# Patient Record
(demographics unavailable — no encounter records)

---

## 2025-01-16 NOTE — PHYSICAL EXAM
[Normal] : affect appropriate [de-identified] : Supple without JVD, or masses appreciated .  Prominent thyromegaly present [de-identified] : obese [de-identified] : Hyperpigmented areas on both distal lower extremities, dry skin, excoriations/scars on back

## 2025-01-16 NOTE — HISTORY OF PRESENT ILLNESS
[de-identified] : The patient is a 69-year-old female with a history of type 2 diabetes, hypertension, chronic renal disease, thyroid disorder who returns for follow-up of IgG monoclonal gammopathy, low folate and iron deficiency. At the last visit in July of 2024, the CBC was significant for a hemoglobin of 11.6, hematocrit 36.0 and a normal white blood count and platelet count. Comprehensive metabolic panel revealed a glucose of 124 but was otherwise unremarkable with a normal creatinine, total protein and calcium. Immunoelectrophoresis again revealed an M spike that was unchanged at 0.5 with normal amounts of IgG, IgA, IgM and an IgG lambda band identified on immunofixation. The kappa to lambda ratio was stable at 1.73 iron panel revealed a low normal saturation at 15% and the ferritin was 73. B12 was greater than 2000 and folate was normal. All of the parameters including LDH, uric acid, CRP, sed rate, beta-2 microglobulin were either normal or stable. Patient has been strictly vegetarian but has recently added chicken and turkey to her diet. Her weight is stable. Today, the patient feels fatigued and complains of occasional sciatica flare ups, chronic right shoulder pain, back discomfort and strain for years. She continues to supplement iron with vitamin C. She denies fever, chills, drenching night sweats, unintentional weight loss, chest pain, shortness of breath, abdominal pain, blood in urine or stool, other sites of bleeding, or recent/frequent infections.

## 2025-01-16 NOTE — ASSESSMENT
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 Based on the prior stable reports from her PCP and nephrologist, the patient wished to defer any further workup, i.e. bone marrow aspirate and biopsy, for the monoclonal gammopathy at that  time. Will continue to monitor. Have ordered B12 and folate, B2MG, CBC with auto differential, CMP, CRP, ferritin, immunoelectrophoresis, iron panel, LDH, manual differential, reticulocyte count, sed rate, uric acid and viscosity. Venipuncture was performed in the office today. Advised that patient call the office to discuss results and further management. Pending results, if stable ,patient will return in 3-4 months.

## 2025-01-16 NOTE — REVIEW OF SYSTEMS
[Fatigue] : fatigue [Joint Pain] : joint pain [Joint Stiffness] : joint stiffness [Negative] : Allergic/Immunologic [Fever] : no fever [Chills] : no chills [Night Sweats] : no night sweats [Recent Change In Weight] : ~T no recent weight change [Eye Pain] : no eye pain [Vision Problems] : no vision problems [Loss of Hearing] : no loss of hearing [Nosebleeds] : no nosebleeds [Chest Pain] : no chest pain [Shortness Of Breath] : no shortness of breath [SOB on Exertion] : no shortness of breath during exertion [Abdominal Pain] : no abdominal pain [Skin Rash] : no skin rash [Dizziness] : no dizziness [Muscle Weakness] : no muscle weakness [Easy Bleeding] : no tendency for easy bleeding [Easy Bruising] : no tendency for easy bruising [Swollen Glands] : no swollen glands [FreeTextEntry9] : Right shoulder pain/strain (chronic), sciatica flareups from time to time

## 2025-01-17 NOTE — ASSESSMENT
[FreeTextEntry1] : 70yo with diet/lifestyle controlled DMII, HTN referred for albuminuria, CKD now here for f/u:  CKD - very stable, continue to monitor, if WENDY would pursue biopsy, currently doing well Albuminuria - discussed weight loss, HTN and DM management paramount, doing well overall HTN - well controlled  - Secondary w/u positive for IgG lambda monoclonal paraprotein on serum MONISHA, small Mspike on SPEP, normal serum FLC ratio. Follows closely with hematology Dr Euceda   RTJACOBO in 4-6 months with labs prior.

## 2025-01-17 NOTE — HISTORY OF PRESENT ILLNESS
[FreeTextEntry1] : 68 yo with diet/lifestyle controlled DMII, HTN referred for albuminuria, CKD now here for f/u:  PCP Dr. Srinivasan Khan Heme/onc - dr snider  Doing well without sodium bicarbonate, reviewed labs in detail acidosis excellent, sCr stable, proteinuria esolved, somehow beta microglobulin lower too.    Social: vegetarian now with only minimal fish, mostly plant based protein. Used to do intermittent fasting  Reports no changes to meds, did not go through this visit MEds: Flaxseed oil 1000 foilic acid 40 mcg zinc 50mg every other day probiotic acidophillus 6.5mg Red yeast rice Coq10 niacin activated charcoal - 1200mg 2 daily turmeric 1000mg Vitamin d3 50mcg Citracel fiber - 500 magnesium glycinate - 500mg a day Iron concentrate tincture - 15mg stinging nettle tincture ***does have diuretic, low bp and bph effects Organic mushroom blend - turketail, metopy, shitake, cordiceps, lions suzanne, carlyn, chaga burdock root tincture  Rxd Janumet 50bg BID, telmisartan 40mg once daily, atenolol 50 po qd

## 2025-07-21 NOTE — HISTORY OF PRESENT ILLNESS
[de-identified] : The patient is a 70-year-old female with a history of type 2 diabetes, hypertension, chronic renal disease, thyroid disorder who returns for follow-up of IgG monoclonal gammopathy, low folate and iron deficiency. At the last visit in January, the CBC was completely normal with a normal hemoglobin of 12.6, white count 5.36, normal differential and a normal platelet count. CMP was normal with a creatinine of 0.84 and a calcium of 10.0. Immunoelectrophoresis was very stable with an M spike of 0.6, normal amounts of IgA, IgG, IgM, and IgG lambda bands identified on immunofixation as before and a kappa to lambda free light chain ratio of 2.03. Iron panel was significant for a TIBC of 437 but otherwise normal and the ferritin was 57. LDH, uric acid, B12, folate, beta-2 microglobulin and C-reactive protein were normal. Viscosity was also normal at 1.7. Patient had blood work performed in early May, at which time the CMP was significant for a glucose of 105, BUN of 28 and EGFR of 58.  The CBC revealed a normal white blood count of 5.6 with a normal differential, hemoglobin of 12.6, hematocrit of 38.9 and a platelet count of 345,000. .  Since the last visit, the patient had developed sciatica bilaterally but more so on the left. She is presently in physical therapy for this. Her weight is stable. Today, the patient complains of allergies, left eye twitching, chronic joint pain in the neck and shoulders. She denies fever, chills, drenching night sweats, unintentional weight loss, chest pain, shortness of breath, abdominal pain, blood in urine or stool, other sites of bleeding.

## 2025-07-21 NOTE — REVIEW OF SYSTEMS
[Joint Pain] : joint pain [Joint Stiffness] : joint stiffness [Negative] : Allergic/Immunologic [Fever] : no fever [Chills] : no chills [Night Sweats] : no night sweats [Fatigue] : no fatigue [Recent Change In Weight] : ~T no recent weight change [Vision Problems] : no vision problems [Dysphagia] : no dysphagia [Nosebleeds] : no nosebleeds [Mucosal Pain] : no mucosal pain [Chest Pain] : no chest pain [Shortness Of Breath] : no shortness of breath [SOB on Exertion] : no shortness of breath during exertion [Abdominal Pain] : no abdominal pain [Dizziness] : no dizziness [Muscle Weakness] : no muscle weakness [Easy Bleeding] : no tendency for easy bleeding [Easy Bruising] : no tendency for easy bruising [Swollen Glands] : no swollen glands [FreeTextEntry3] : Left eye twitching [FreeTextEntry4] : Has allergies [FreeTextEntry9] : neck and shoulder aches ( chronic) , recent sciatica . In PT [de-identified] : Left eye twitching

## 2025-07-21 NOTE — ASSESSMENT
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 Based on the prior stable reports from her PCP and nephrologist, the patient wished to defer any further workup, i.e. bone marrow aspirate and biopsy, for the monoclonal gammopathy at that time. Will continue to monitor. Have ordered B12 and folate, B2MG, CBC with auto differential, CMP, CRP, ferritin, Free Kappa/Lambda Urine, immunoelectrophoresis, iron panel, LDH, Protein Electrophoresis, Random Urine, reticulocyte count, uric acid and viscosity. Venipuncture will be performed at the [facility] accession lab today. Advised that patient call the office to discuss results and further management. Pending results, if stable, patient will return in 4-5 months.

## 2025-07-21 NOTE — PHYSICAL EXAM
[Normal] : affect appropriate [de-identified] : Supple without JVD, or masses appreciated .  Prominent thyromegaly present [de-identified] : obese [de-identified] : Hyperpigmented areas on both distal lower extremities

## 2025-07-21 NOTE — REASON FOR VISIT
[Follow-Up Visit] : a follow-up visit for [FreeTextEntry2] : IgG lambda monoclonal gammopathy, iron deficiency, low folate